# Patient Record
Sex: FEMALE | Race: WHITE | Employment: FULL TIME | URBAN - METROPOLITAN AREA
[De-identification: names, ages, dates, MRNs, and addresses within clinical notes are randomized per-mention and may not be internally consistent; named-entity substitution may affect disease eponyms.]

---

## 2021-03-29 ENCOUNTER — OFFICE VISIT (OUTPATIENT)
Dept: BARIATRICS/WEIGHT MGMT | Age: 21
End: 2021-03-29
Payer: COMMERCIAL

## 2021-03-29 VITALS
SYSTOLIC BLOOD PRESSURE: 124 MMHG | WEIGHT: 217.6 LBS | DIASTOLIC BLOOD PRESSURE: 72 MMHG | OXYGEN SATURATION: 97 % | HEIGHT: 64 IN | HEART RATE: 75 BPM | BODY MASS INDEX: 37.15 KG/M2 | TEMPERATURE: 97.4 F

## 2021-03-29 DIAGNOSIS — E66.01 MORBID OBESITY DUE TO EXCESS CALORIES (HCC): Primary | ICD-10-CM

## 2021-03-29 PROCEDURE — 99204 OFFICE O/P NEW MOD 45 MIN: CPT | Performed by: NURSE PRACTITIONER

## 2021-03-29 SDOH — ECONOMIC STABILITY: FOOD INSECURITY: WITHIN THE PAST 12 MONTHS, YOU WORRIED THAT YOUR FOOD WOULD RUN OUT BEFORE YOU GOT MONEY TO BUY MORE.: NOT ASKED

## 2021-03-29 NOTE — PROGRESS NOTES
Subjective     Chief Complaint   Patient presents with    New Patient     NP New Medication Has surge benefits       Vitals:    03/29/21 1113   BP: 124/72   Pulse: 75   Temp: 97.4 °F (36.3 °C)   SpO2: 97%     Wt Readings from Last 3 Encounters:   03/29/21 217 lb 9.6 oz (98.7 kg)     The patient first recognized that she had a weight problem about 2 years ago. The patient's lowest weight in the last five years was 132 lbs, and the highest weight in the last five years was 240 lbs. Weight Loss Program History:  The patient states she has tried none. The pt has cut carbs and pop from her diet. The most weight lost ever with diet and exercise was 20 Lbs, but unfortunately only kept them of for 1month. These attempts have been temporarily successful with weight loss, but have failed to sustain adequate weight loss. Hx of depression, not currently on medication. States doing well. Sexually active, female patterns. No OCP. No current medications. No prior labs. NO prior weight loss medications. Eats around 2 meals per day. No current pop or juices. No current exercise. Water intake is good. Comorbid Conditions:  Significant diseases effecting this patient are History reviewed. No pertinent past medical history. .    Thoroughly reviewed the patient's medical history, family history, social history and review of systems with the patient today in the office. Please see medical record for pertinent positives. Allergies:  No Known Allergies    Past Surgical History:  Past Surgical History:   Procedure Laterality Date    TONSILLECTOMY         Family History:  No family history on file.     Social History:  Social History     Socioeconomic History    Marital status: Single     Spouse name: Not on file    Number of children: Not on file    Years of education: Not on file    Highest education level: Not on file   Occupational History     Employer: [employer] resource strain: Not on file    Food insecurity     Worry: Not on file     Inability: Not on file    Transportation needs     Medical: Not on file     Non-medical: Not on file   Tobacco Use    Smoking status: Current Every Day Smoker     Types: Cigarettes   Substance and Sexual Activity    Alcohol use: Not Currently    Drug use: Never    Sexual activity: Not on file   Lifestyle    Physical activity     Days per week: Not on file     Minutes per session: Not on file    Stress: Not on file   Relationships    Social connections     Talks on phone: Not on file     Gets together: Not on file     Attends Congregation service: Not on file     Active member of club or organization: Not on file     Attends meetings of clubs or organizations: Not on file     Relationship status: Not on file    Intimate partner violence     Fear of current or ex partner: Not on file     Emotionally abused: Not on file     Physically abused: Not on file     Forced sexual activity: Not on file   Other Topics Concern    Not on file   Social History Narrative    Not on file       Review of Systems - History obtained from the patient.     Review of Systems - General ROS: negative for - chills, fever, hot flashes or night sweats  ENT ROS: negative for - headaches, oral lesions, sore throat, vertigo or visual changes  Allergy and Immunology ROS: negative for - hives or nasal congestion  Endocrine ROS: negative for - hair pattern changes, mood swings or polydipsia/polyuria  Respiratory ROS: no cough, shortness of breath, or wheezing  Cardiovascular ROS: no chest pain or dyspnea on exertion  Gastrointestinal ROS: no abdominal pain, change in bowel habits, or black or bloody stools  Genito-Urinary ROS: no dysuria, incontinence, trouble voiding, or hematuria  Musculoskeletal ROS: Negative for joint pain or myalgia  Neurological ROS: negative for - behavioral changes, dizziness, headaches, impaired coordination/balance, numbness/tingling or seizures  Dermatological ROS: negative for - eczema or nail changes  Psychological ROS: negative for - anxiety, depression or suicidal ideation     Objective     Physical Exam   Constitutional: Oriented to person, place, and time and well-developed, well-nourished, and in no distress. No distress. Obese   HENT:   Head: Normocephalic and atraumatic. Eyes: Conjunctivae are normal. Pupils are equal, round, and reactive to light. Neck: Normal range of motion. Neck supple. Cardiovascular: Normal rate, regular rhythm, normal heart sounds and intact distal pulses. No murmur heard. Pulmonary/Chest: Effort normal and breath sounds normal. No respiratory distress. Abdominal: Soft. Bowel sounds are normal. Exhibits no distension. There is no tenderness. Large. Musculoskeletal: Exhibits no edema or tenderness. Lymphadenopathy: Deferred. Neurological: She is alert and oriented to person, place, and time. No cranial nerve deficit. Skin: Skin is warm. She is not diaphoretic. Psychiatric: Mood, memory, affect and judgment normal.     Assessment  and Plan    Patient here today to discuss candidacy for weight loss medication: BMI of >30 at 37. Obesity with a BMI of 37, after risk stratification patient is a good candidate for Contrave weight loss medication. Patient was given medication information sheet today and discussed the below information. Denies any history of CAD, uncontrolled HTN, hyperthyroidism, MAOI therapy, glaucoma or hx of drug abuse. Two forms of birth control, discussed pregnancy category x. Contraindications: Patient denies, any eating disorders, SI, ETOH use, pregnancy, uncontrolled HTN, CAD, seizures, bupropion products, opioid use and MAOI. Do not recommend > age 72, was not studied. 1 year long study. Contrave (Bupropion-naltrexone)  Discussed possible side effects in detail with patient. Insomnia, vomiting dizziness and dry mouth 7-10 percent of patients.  Also, concern for psychiatric SE effects and SI given the Bupropion. Will start with initial 8mg (Naltrexone) and 90 mg (bupropion) daily in the morning, after one week increase to 1 tablet BID, week 3 2 tablets in the am and 1 in the pm, and then by week four to 2 tablets BID. Patient instructed on use. Plan    1. Morbid obesity due to excess calories (HCC)    - Vitamin D 25 Hydroxy; Future  - Vitamin B12 & Folate; Future  - Lipid Panel; Future  - Comprehensive Metabolic Panel; Future  - CBC Auto Differential; Future  - TSH without Reflex; Future  - Pregnancy, Urine; Future    - Discussed weight loss program with patient and the metabolic components of obesity and insulin resistance over time. - Ultimately will need to change overall eating behaviors to have success in continued management with weight loss. - Will continue to journal food items and discussed the below recommendations to patient. - All questions answered and overall appears very receptive.   - Plan in person class in 2 wks. Options were contrave or hadley given age <18. Did not recommend controlled. Needs labs and UP. Patient was encouraged to journal all food intake using [employer] pal. Keep calorie level at approximately [id number]. Protein intake is to be a minimum of 60 grams per day. Water drinking was encouraged with a goal of 64oz-128oz daily. Beverages to be calorie free except for milk. Every other beverage should be water. They are to avoid soda. Continue to increase level of physical activity. I spent 45 minutes with patient and more than 50% of the office visit today was spent in face to face counseling regarding diet and exercise, counting calories, complying with the diet recommendations. Patient counseled on the benefits of treatment plan at length while in the office today. Patient states an understanding and willingness to proceed with the recommended weight loss plan.     No orders of the defined types were placed in this encounter. Orders Placed This Encounter   Procedures    Vitamin D 25 Hydroxy     Standing Status:   Future     Standing Expiration Date:   3/29/2022    Vitamin B12 & Folate     Standing Status:   Future     Standing Expiration Date:   3/29/2022    Lipid Panel     Standing Status:   Future     Standing Expiration Date:   3/29/2022     Order Specific Question:   Is Patient Fasting?/# of Hours     Answer:   fasting.  Comprehensive Metabolic Panel     Standing Status:   Future     Standing Expiration Date:   3/29/2022    CBC Auto Differential     Standing Status:   Future     Standing Expiration Date:   3/29/2022    TSH without Reflex     Standing Status:   Future     Standing Expiration Date:   3/29/2022    Pregnancy, Urine     Standing Status:   Future     Standing Expiration Date:   3/29/2022       Follow Up:  Return in about 2 weeks (around 4/12/2021) for Weight Check, New Medication Group Class.     Penny Lind, CNP

## 2021-04-13 ENCOUNTER — HOSPITAL ENCOUNTER (OUTPATIENT)
Age: 21
Discharge: HOME | End: 2021-04-13
Payer: COMMERCIAL

## 2021-04-13 LAB
ALBUMIN SERPL-MCNC: 4.4 GM/DL (ref 3.4–5)
ALP BLD-CCNC: 87 IU/L (ref 40–128)
ALT SERPL-CCNC: 36 U/L (ref 10–40)
ANION GAP SERPL CALCULATED.3IONS-SCNC: 10 MMOL/L (ref 4–16)
AST SERPL-CCNC: 17 IU/L (ref 15–37)
BASOPHILS ABSOLUTE: 0.1 K/CU MM
BASOPHILS RELATIVE PERCENT: 0.8 % (ref 0–1)
BILIRUB SERPL-MCNC: 0.4 MG/DL (ref 0–1)
BUN BLDV-MCNC: 11 MG/DL (ref 6–23)
CALCIUM SERPL-MCNC: 9.4 MG/DL (ref 8.3–10.6)
CHLORIDE BLD-SCNC: 103 MMOL/L (ref 99–110)
CHOLESTEROL: 161 MG/DL
CO2: 25 MMOL/L (ref 21–32)
CREAT SERPL-MCNC: 0.8 MG/DL (ref 0.6–1.1)
DIFFERENTIAL TYPE: ABNORMAL
EOSINOPHILS ABSOLUTE: 0.1 K/CU MM
EOSINOPHILS RELATIVE PERCENT: 1.4 % (ref 0–3)
FOLATE: 7.6 NG/ML (ref 3.1–17.5)
GFR AFRICAN AMERICAN: >60 ML/MIN/1.73M2
GFR NON-AFRICAN AMERICAN: >60 ML/MIN/1.73M2
GLUCOSE BLD-MCNC: 100 MG/DL (ref 70–99)
HCT VFR BLD CALC: 45.8 % (ref 37–47)
HDLC SERPL-MCNC: 34 MG/DL
HEMOGLOBIN: 14.6 GM/DL (ref 12.5–16)
IMMATURE NEUTROPHIL %: 0.4 % (ref 0–0.43)
INTERPRETATION: NORMAL
LDL CHOLESTEROL DIRECT: 106 MG/DL
LYMPHOCYTES ABSOLUTE: 2.6 K/CU MM
LYMPHOCYTES RELATIVE PERCENT: 32.6 % (ref 24–44)
MCH RBC QN AUTO: 26.9 PG (ref 27–31)
MCHC RBC AUTO-ENTMCNC: 31.9 % (ref 32–36)
MCV RBC AUTO: 84.5 FL (ref 78–100)
MONOCYTES ABSOLUTE: 0.5 K/CU MM
MONOCYTES RELATIVE PERCENT: 6.4 % (ref 0–4)
NUCLEATED RBC %: 0 %
PDW BLD-RTO: 12.7 % (ref 11.7–14.9)
PLATELET # BLD: 311 K/CU MM (ref 140–440)
PMV BLD AUTO: 9.3 FL (ref 7.5–11.1)
POTASSIUM SERPL-SCNC: 4.5 MMOL/L (ref 3.5–5.1)
PREGNANCY, URINE: NEGATIVE
RBC # BLD: 5.42 M/CU MM (ref 4.2–5.4)
SEGMENTED NEUTROPHILS ABSOLUTE COUNT: 4.7 K/CU MM
SEGMENTED NEUTROPHILS RELATIVE PERCENT: 58.4 % (ref 36–66)
SODIUM BLD-SCNC: 138 MMOL/L (ref 135–145)
SPECIFIC GRAVITY, URINE: 1.02 (ref 1–1.03)
TOTAL IMMATURE NEUTOROPHIL: 0.03 K/CU MM
TOTAL NUCLEATED RBC: 0 K/CU MM
TOTAL PROTEIN: 7.2 GM/DL (ref 6.4–8.2)
TRIGL SERPL-MCNC: 160 MG/DL
TSH HIGH SENSITIVITY: 1.27 UIU/ML (ref 0.27–4.2)
VITAMIN B-12: 843.2 PG/ML (ref 211–911)
VITAMIN D 25-HYDROXY: 27.74 NG/ML
WBC # BLD: 8 K/CU MM (ref 4–10.5)

## 2021-04-13 PROCEDURE — 80061 LIPID PANEL: CPT

## 2021-04-13 PROCEDURE — 80053 COMPREHEN METABOLIC PANEL: CPT

## 2021-04-13 PROCEDURE — 85025 COMPLETE CBC W/AUTO DIFF WBC: CPT

## 2021-04-13 PROCEDURE — 81025 URINE PREGNANCY TEST: CPT

## 2021-04-13 PROCEDURE — 36415 COLL VENOUS BLD VENIPUNCTURE: CPT

## 2021-04-13 PROCEDURE — 82607 VITAMIN B-12: CPT

## 2021-04-13 PROCEDURE — 82306 VITAMIN D 25 HYDROXY: CPT

## 2021-04-13 PROCEDURE — 83721 ASSAY OF BLOOD LIPOPROTEIN: CPT

## 2021-04-13 PROCEDURE — 84443 ASSAY THYROID STIM HORMONE: CPT

## 2021-04-13 PROCEDURE — 82746 ASSAY OF FOLIC ACID SERUM: CPT

## 2021-04-14 ENCOUNTER — OFFICE VISIT (OUTPATIENT)
Dept: BARIATRICS/WEIGHT MGMT | Age: 21
End: 2021-04-14
Payer: COMMERCIAL

## 2021-04-14 VITALS
SYSTOLIC BLOOD PRESSURE: 120 MMHG | TEMPERATURE: 98.1 F | BODY MASS INDEX: 38.06 KG/M2 | HEART RATE: 93 BPM | OXYGEN SATURATION: 97 % | WEIGHT: 214.8 LBS | HEIGHT: 63 IN | DIASTOLIC BLOOD PRESSURE: 72 MMHG

## 2021-04-14 DIAGNOSIS — E66.01 MORBID OBESITY DUE TO EXCESS CALORIES (HCC): Primary | ICD-10-CM

## 2021-04-14 PROCEDURE — G8427 DOCREV CUR MEDS BY ELIG CLIN: HCPCS | Performed by: NURSE PRACTITIONER

## 2021-04-14 PROCEDURE — 4004F PT TOBACCO SCREEN RCVD TLK: CPT | Performed by: NURSE PRACTITIONER

## 2021-04-14 PROCEDURE — 99215 OFFICE O/P EST HI 40 MIN: CPT | Performed by: NURSE PRACTITIONER

## 2021-04-14 PROCEDURE — G8417 CALC BMI ABV UP PARAM F/U: HCPCS | Performed by: NURSE PRACTITIONER

## 2021-04-14 ASSESSMENT — ENCOUNTER SYMPTOMS
EYE PAIN: 0
SHORTNESS OF BREATH: 0
DIARRHEA: 0
ABDOMINAL PAIN: 0
TROUBLE SWALLOWING: 0
NAUSEA: 0
WHEEZING: 0
ABDOMINAL DISTENTION: 0
RHINORRHEA: 0
CHEST TIGHTNESS: 0

## 2021-04-14 NOTE — PROGRESS NOTES
Layla Carmen  2000  21 y.o. SUBJECT GIOVANNI:    Chief Complaint   Patient presents with    Follow-up     f/u Medication lass, contrave     No past medical history on file. Past Surgical History:   Procedure Laterality Date    TONSILLECTOMY       Current Outpatient Medications   Medication Sig Dispense Refill    naltrexone-buPROPion (CONTRAVE) 8-90 MG per extended release tablet Week 1, 1 tab in am. Week 2, 1 tab in am and 1 in pm. Week 3, 2 tab in am and 1 in pm. Week 4, 2 tabs in am and 2 in pm. 120 tablet 0     No current facility-administered medications for this visit. No family history on file. No Known Allergies     HPI   Patient had an initial individual provider consult and is here for their follow up nutrition. At initial consult weight loss medication contrave was decided based on current BMI, prior attempts, and exclusion criteria based on past medical history. Patient had work up completed and was reviewed with patient today. No new medications, recent illnesses or new medical history. Patient dines out to a sit down restaurant 1 times per month. Patient eats fast food meals 0 times per week. Drinks mostly water    24 hour recall/food frequency chart:  Breakfast: slim fast shake  Snack no   Lunch: ham and cheese no bread, salad  Snack: no  Dinner: ham and cheese no break, salad  Snack: beef stick, cheese stick    Total daily calories: 1200      Exercises new job has lots of walking    Review of Systems   Constitutional: Negative. Negative for appetite change, fatigue and fever. HENT: Negative for congestion, dental problem, hearing loss, rhinorrhea and trouble swallowing. Eyes: Negative for pain. Respiratory: Negative for chest tightness, shortness of breath and wheezing. Cardiovascular: Negative for chest pain, palpitations and leg swelling. Gastrointestinal: Negative for abdominal distention, abdominal pain, diarrhea and nausea.    Endocrine: Negative for cold subscore is 5. GCS motor subscore is 6. Motor: No abnormal muscle tone. Psychiatric:         Behavior: Behavior normal.         Judgment: Judgment normal.         ASSESSMENT/ PLAN:    1. Morbid obesity due to excess calories (HCC)    - naltrexone-buPROPion (CONTRAVE) 8-90 MG per extended release tablet; Week 1, 1 tab in am. Week 2, 1 tab in am and 1 in pm. Week 3, 2 tab in am and 1 in pm. Week 4, 2 tabs in am and 2 in pm.  Dispense: 120 tablet; Refill: 0    - Patient attended weight loss medication follow up visit today. - Patient given prescription today to start contrave. - Educated on importance of 2 forms of contraception, patient verbalizes. - Discussed possible side effects with patient in length via power point.    - Will monitor weight and vital signs weekly. Patient informed of importance and compliance with weekly weight and vital checks. - Will have vital check weekly and RTC in 1 month with NP.     - Obesity with a BMI of 38  - Patient educated in depth on defining obesity and the risk factors associated when BMI >30 via power point.     - Recommend MVI Ca/Vit D daily.   - Pt instructed on healthy diet to support weight loss. Instructed on goal calorie intake, not less than 1,000 kcals daily. Educated on healthy diet framework to include adequate lean protein, non-starchy vegetables, and moderate carbohydrate and fruit intake. - Instructed on fluid intake at least 64 oz daily. Patient instructed to refrain from any calorie enriched drinks. Recommend using meal replacements, 1-3 daily to support maintaining adequate protein and calorie goals. - Patient also educated on celebrate products sold in house and appropriate recommendations for vitamins and meal replacement shakes. - Patient was also informed on weight loss program and specific requirements to be met by all. Weight loss program contract signed prior.      Patient aware of the medication compliance guidelines and will be